# Patient Record
Sex: MALE | Race: WHITE | NOT HISPANIC OR LATINO
[De-identification: names, ages, dates, MRNs, and addresses within clinical notes are randomized per-mention and may not be internally consistent; named-entity substitution may affect disease eponyms.]

---

## 2017-02-27 ENCOUNTER — MOBILE ON CALL (OUTPATIENT)
Age: 47
End: 2017-02-27

## 2017-02-27 DIAGNOSIS — M25.569 PAIN IN UNSPECIFIED KNEE: ICD-10-CM

## 2017-03-06 ENCOUNTER — APPOINTMENT (OUTPATIENT)
Dept: ORTHOPEDIC SURGERY | Facility: CLINIC | Age: 47
End: 2017-03-06

## 2017-03-06 VITALS
HEIGHT: 70 IN | BODY MASS INDEX: 29.35 KG/M2 | DIASTOLIC BLOOD PRESSURE: 69 MMHG | SYSTOLIC BLOOD PRESSURE: 107 MMHG | WEIGHT: 205 LBS | HEART RATE: 87 BPM

## 2017-03-06 DIAGNOSIS — Z78.9 OTHER SPECIFIED HEALTH STATUS: ICD-10-CM

## 2017-03-06 DIAGNOSIS — M23.91 UNSPECIFIED INTERNAL DERANGEMENT OF RIGHT KNEE: ICD-10-CM

## 2017-04-17 ENCOUNTER — APPOINTMENT (OUTPATIENT)
Dept: ORTHOPEDIC SURGERY | Facility: CLINIC | Age: 47
End: 2017-04-17

## 2017-04-17 VITALS
HEIGHT: 70 IN | HEART RATE: 74 BPM | BODY MASS INDEX: 29.35 KG/M2 | WEIGHT: 205 LBS | SYSTOLIC BLOOD PRESSURE: 110 MMHG | DIASTOLIC BLOOD PRESSURE: 74 MMHG

## 2017-05-16 ENCOUNTER — MESSAGE (OUTPATIENT)
Age: 47
End: 2017-05-16

## 2017-05-22 ENCOUNTER — OUTPATIENT (OUTPATIENT)
Dept: OUTPATIENT SERVICES | Facility: HOSPITAL | Age: 47
LOS: 1 days | End: 2017-05-22
Payer: COMMERCIAL

## 2017-05-22 VITALS
HEART RATE: 70 BPM | WEIGHT: 217.38 LBS | TEMPERATURE: 98 F | RESPIRATION RATE: 16 BRPM | DIASTOLIC BLOOD PRESSURE: 78 MMHG | HEIGHT: 70 IN | SYSTOLIC BLOOD PRESSURE: 124 MMHG

## 2017-05-22 DIAGNOSIS — Z41.1 ENCOUNTER FOR COSMETIC SURGERY: Chronic | ICD-10-CM

## 2017-05-22 DIAGNOSIS — S83.241A OTHER TEAR OF MEDIAL MENISCUS, CURRENT INJURY, RIGHT KNEE, INITIAL ENCOUNTER: ICD-10-CM

## 2017-05-22 DIAGNOSIS — Z01.818 ENCOUNTER FOR OTHER PREPROCEDURAL EXAMINATION: ICD-10-CM

## 2017-05-22 DIAGNOSIS — Z98.890 OTHER SPECIFIED POSTPROCEDURAL STATES: Chronic | ICD-10-CM

## 2017-05-22 LAB
ALBUMIN SERPL ELPH-MCNC: 4.5 G/DL — SIGNIFICANT CHANGE UP (ref 3.3–5.2)
ALP SERPL-CCNC: 67 U/L — SIGNIFICANT CHANGE UP (ref 40–120)
ALT FLD-CCNC: 22 U/L — SIGNIFICANT CHANGE UP
ANION GAP SERPL CALC-SCNC: 13 MMOL/L — SIGNIFICANT CHANGE UP (ref 5–17)
APTT BLD: 32.3 SEC — SIGNIFICANT CHANGE UP (ref 27.5–37.4)
AST SERPL-CCNC: 21 U/L — SIGNIFICANT CHANGE UP
BILIRUB SERPL-MCNC: 0.7 MG/DL — SIGNIFICANT CHANGE UP (ref 0.4–2)
BUN SERPL-MCNC: 19 MG/DL — SIGNIFICANT CHANGE UP (ref 8–20)
CALCIUM SERPL-MCNC: 9.2 MG/DL — SIGNIFICANT CHANGE UP (ref 8.6–10.2)
CHLORIDE SERPL-SCNC: 103 MMOL/L — SIGNIFICANT CHANGE UP (ref 98–107)
CO2 SERPL-SCNC: 25 MMOL/L — SIGNIFICANT CHANGE UP (ref 22–29)
CREAT SERPL-MCNC: 0.76 MG/DL — SIGNIFICANT CHANGE UP (ref 0.5–1.3)
GLUCOSE SERPL-MCNC: 112 MG/DL — SIGNIFICANT CHANGE UP (ref 70–115)
HCT VFR BLD CALC: 41.7 % — LOW (ref 42–52)
HGB BLD-MCNC: 15.1 G/DL — SIGNIFICANT CHANGE UP (ref 14–18)
INR BLD: 1.06 RATIO — SIGNIFICANT CHANGE UP (ref 0.88–1.16)
MCHC RBC-ENTMCNC: 31.1 PG — HIGH (ref 27–31)
MCHC RBC-ENTMCNC: 36.2 G/DL — HIGH (ref 32–36)
MCV RBC AUTO: 86 FL — SIGNIFICANT CHANGE UP (ref 80–94)
PLATELET # BLD AUTO: 196 K/UL — SIGNIFICANT CHANGE UP (ref 150–400)
POTASSIUM SERPL-MCNC: 3.6 MMOL/L — SIGNIFICANT CHANGE UP (ref 3.5–5.3)
POTASSIUM SERPL-SCNC: 3.6 MMOL/L — SIGNIFICANT CHANGE UP (ref 3.5–5.3)
PROT SERPL-MCNC: 7.2 G/DL — SIGNIFICANT CHANGE UP (ref 6.6–8.7)
PROTHROM AB SERPL-ACNC: 11.7 SEC — SIGNIFICANT CHANGE UP (ref 9.8–12.7)
RBC # BLD: 4.85 M/UL — SIGNIFICANT CHANGE UP (ref 4.6–6.2)
RBC # FLD: 12.2 % — SIGNIFICANT CHANGE UP (ref 11–15.6)
SODIUM SERPL-SCNC: 141 MMOL/L — SIGNIFICANT CHANGE UP (ref 135–145)
WBC # BLD: 5.8 K/UL — SIGNIFICANT CHANGE UP (ref 4.8–10.8)
WBC # FLD AUTO: 5.8 K/UL — SIGNIFICANT CHANGE UP (ref 4.8–10.8)

## 2017-05-22 PROCEDURE — 85027 COMPLETE CBC AUTOMATED: CPT

## 2017-05-22 PROCEDURE — G0463: CPT

## 2017-05-22 PROCEDURE — 85610 PROTHROMBIN TIME: CPT

## 2017-05-22 PROCEDURE — 80053 COMPREHEN METABOLIC PANEL: CPT

## 2017-05-22 PROCEDURE — 85730 THROMBOPLASTIN TIME PARTIAL: CPT

## 2017-05-22 NOTE — H&P PST ADULT - HISTORY OF PRESENT ILLNESS
48 y/o male c/o right knee pain had MRI which showed right knee meniscus tear patient now presents for right knee arthroscopy partial medial meniscectomy

## 2017-05-22 NOTE — H&P PST ADULT - FAMILY HISTORY
Father  Still living? Yes, Estimated age: 71-80  Family history of BPH, Age at diagnosis: Age Unknown

## 2017-05-22 NOTE — H&P PST ADULT - NEGATIVE SKIN SYMPTOMS
no tumor/no itching/no pitted nails/no hair loss/no change in size/color of mole/no rash/no brittle nails/no dryness

## 2017-05-22 NOTE — H&P PST ADULT - NEGATIVE GASTROINTESTINAL SYMPTOMS
no melena/no constipation/no hematochezia/no abdominal pain/no change in bowel habits/no vomiting/no nausea/no steatorrhea/no jaundice/no diarrhea/no flatulence/no hiccoughs

## 2017-05-22 NOTE — H&P PST ADULT - NEGATIVE BREAST SYMPTOMS
no breast tenderness R/no nipple discharge L/no breast lump L/no nipple discharge R/no breast lump R/no breast tenderness L

## 2017-05-22 NOTE — H&P PST ADULT - GASTROINTESTINAL DETAILS
no distention/no rebound tenderness/no bruit/no organomegaly/no rigidity/no guarding/normal/soft/bowel sounds normal/nontender/no masses palpable

## 2017-05-22 NOTE — H&P PST ADULT - NEGATIVE NEUROLOGICAL SYMPTOMS
no tremors/no loss of consciousness/no weakness/no generalized seizures/no transient paralysis/no headache/no paresthesias/no confusion/no loss of sensation/no hemiparesis/no vertigo/no difficulty walking/no facial palsy/no syncope/no focal seizures

## 2017-05-22 NOTE — H&P PST ADULT - NEGATIVE CARDIOVASCULAR SYMPTOMS
no peripheral edema/no paroxysmal nocturnal dyspnea/no palpitations/no orthopnea/no dyspnea on exertion/no chest pain/no claudication

## 2017-05-22 NOTE — H&P PST ADULT - NEGATIVE MUSCULOSKELETAL SYMPTOMS
no neck pain/no arm pain L/no muscle weakness/no arthralgia/no muscle cramps/no joint swelling/no myalgia

## 2017-05-22 NOTE — H&P PST ADULT - NEGATIVE GENERAL GENITOURINARY SYMPTOMS
no bladder infections/no urinary hesitancy/no dysuria/no renal colic/no incontinence/no flank pain L/normal urinary frequency/no urine discoloration/no flank pain R/no hematuria/normal libido/no nocturia/no gas in urine

## 2017-05-22 NOTE — H&P PST ADULT - NSANTHOSAYNRD_GEN_A_CORE
No. TRINI screening performed.  STOP BANG Legend: 0-2 = LOW Risk; 3-4 = INTERMEDIATE Risk; 5-8 = HIGH Risk

## 2017-05-22 NOTE — H&P PST ADULT - RS GEN PE MLT RESP DETAILS PC
no intercostal retractions/no chest wall tenderness/no subcutaneous emphysema/no rhonchi/airway patent/clear to auscultation bilaterally/no wheezes/good air movement/respirations non-labored/no rales/normal/breath sounds equal

## 2017-05-22 NOTE — H&P PST ADULT - NEGATIVE MALE-SPECIFIC SYMPTOMS
no impotence/no urethral discharge/no scrotal mass L/no undescended testicle R/no erectile dysfunction/no genital sores/no ejaculatory dysfunction/no scrotal mass R/no undescended testicle L/not applicable

## 2017-05-22 NOTE — H&P PST ADULT - NEUROLOGICAL DETAILS
deep reflexes intact/normal strength/sensation intact/superficial reflexes intact/alert and oriented x 3/responds to pain/cranial nerves intact/responds to verbal commands/no spontaneous movement

## 2017-05-22 NOTE — H&P PST ADULT - NEGATIVE ENMT SYMPTOMS
no vertigo/no hearing difficulty/no nasal discharge/no gum bleeding/no tinnitus/no abnormal taste sensation/no ear pain/no throat pain/no post-nasal discharge/no recurrent cold sores/no nose bleeds/no dysphagia/no dry mouth/no nasal obstruction

## 2017-05-22 NOTE — H&P PST ADULT - NEGATIVE PSYCHIATRIC SYMPTOMS
no paranoia/no memory loss/no depression/no anxiety/no mood swings/no suicidal ideation/no visual hallucinations/no insomnia/no agitation/no hyperactivity/no auditory hallucinations

## 2017-05-22 NOTE — H&P PST ADULT - PSH
S/P shoulder surgery  2012 left S/P nasal septoplasty    S/P rhinoplasty    S/P shoulder surgery  2012 left

## 2017-05-22 NOTE — H&P PST ADULT - NEGATIVE GENERAL SYMPTOMS
no polyphagia/no polyuria/no anorexia/no malaise/no sweating/no fatigue/no chills/no weight loss/no polydipsia/no fever/no weight gain

## 2017-05-22 NOTE — H&P PST ADULT - NEGATIVE OPHTHALMOLOGIC SYMPTOMS
no blurred vision R/no pain R/no lacrimation R/no irritation R/no blurred vision L/no loss of vision L/no lacrimation L/no scleral injection R/no irritation L/no discharge L/no loss of vision R/no scleral injection L/no diplopia/no photophobia/no pain L/no discharge R

## 2017-05-22 NOTE — H&P PST ADULT - MUSCULOSKELETAL
details… detailed exam no joint swelling/no joint warmth/normal strength/no calf tenderness/normal/ROM intact/no joint erythema

## 2017-05-22 NOTE — ASU PATIENT PROFILE, ADULT - NS PRO PT RIGHT SUPPORT PERSON
January 22, 2017     Patient: Ashley Nolasco   YOB: 1996   Date of Visit: 1/22/2017       To Whom It May Concern:    It is my medical opinion that Ashley Nolasco may return to work on 1/24/17.  Please excuse 1/22/17 and 1/23/17.  Thank you..           Sincerely,         PROVIDER ANNY BURNETT    CC: Ashley Nolasco  
same name as above

## 2017-05-26 ENCOUNTER — RESULT REVIEW (OUTPATIENT)
Age: 47
End: 2017-05-26

## 2017-05-26 ENCOUNTER — TRANSCRIPTION ENCOUNTER (OUTPATIENT)
Age: 47
End: 2017-05-26

## 2017-05-26 ENCOUNTER — OUTPATIENT (OUTPATIENT)
Dept: OUTPATIENT SERVICES | Facility: HOSPITAL | Age: 47
LOS: 1 days | End: 2017-05-26
Payer: COMMERCIAL

## 2017-05-26 ENCOUNTER — APPOINTMENT (OUTPATIENT)
Dept: ORTHOPEDIC SURGERY | Facility: HOSPITAL | Age: 47
End: 2017-05-26

## 2017-05-26 VITALS
RESPIRATION RATE: 16 BRPM | WEIGHT: 210.1 LBS | DIASTOLIC BLOOD PRESSURE: 68 MMHG | OXYGEN SATURATION: 97 % | TEMPERATURE: 98 F | HEIGHT: 70 IN | HEART RATE: 74 BPM | SYSTOLIC BLOOD PRESSURE: 103 MMHG

## 2017-05-26 VITALS
OXYGEN SATURATION: 100 % | DIASTOLIC BLOOD PRESSURE: 66 MMHG | RESPIRATION RATE: 12 BRPM | SYSTOLIC BLOOD PRESSURE: 104 MMHG | HEART RATE: 62 BPM

## 2017-05-26 DIAGNOSIS — S83.241A OTHER TEAR OF MEDIAL MENISCUS, CURRENT INJURY, RIGHT KNEE, INITIAL ENCOUNTER: ICD-10-CM

## 2017-05-26 DIAGNOSIS — Z98.890 OTHER SPECIFIED POSTPROCEDURAL STATES: Chronic | ICD-10-CM

## 2017-05-26 DIAGNOSIS — Z41.1 ENCOUNTER FOR COSMETIC SURGERY: Chronic | ICD-10-CM

## 2017-05-26 PROCEDURE — 88304 TISSUE EXAM BY PATHOLOGIST: CPT | Mod: 26

## 2017-05-26 PROCEDURE — 29881 ARTHRS KNE SRG MNISECTMY M/L: CPT | Mod: AS,RT

## 2017-05-26 PROCEDURE — 88304 TISSUE EXAM BY PATHOLOGIST: CPT

## 2017-05-26 PROCEDURE — 29881 ARTHRS KNE SRG MNISECTMY M/L: CPT | Mod: RT

## 2017-05-26 RX ORDER — FENTANYL CITRATE 50 UG/ML
25 INJECTION INTRAVENOUS
Qty: 0 | Refills: 0 | Status: DISCONTINUED | OUTPATIENT
Start: 2017-05-26 | End: 2017-05-26

## 2017-05-26 RX ORDER — SODIUM CHLORIDE 9 MG/ML
1000 INJECTION, SOLUTION INTRAVENOUS
Qty: 0 | Refills: 0 | Status: DISCONTINUED | OUTPATIENT
Start: 2017-05-26 | End: 2017-05-26

## 2017-05-26 RX ORDER — ONDANSETRON 8 MG/1
4 TABLET, FILM COATED ORAL ONCE
Qty: 0 | Refills: 0 | Status: DISCONTINUED | OUTPATIENT
Start: 2017-05-26 | End: 2017-05-26

## 2017-05-26 NOTE — ASU DISCHARGE PLAN (ADULT/PEDIATRIC). - MEDICATION SUMMARY - MEDICATIONS TO TAKE
I will START or STAY ON the medications listed below when I get home from the hospital:    Percocet 5/325 oral tablet  -- 1 tab(s) by mouth every 6 hours, As Needed MDD:4  -- Caution federal law prohibits the transfer of this drug to any person other  than the person for whom it was prescribed.  May cause drowsiness.  Alcohol may intensify this effect.  Use care when operating dangerous machinery.  This prescription cannot be refilled.  This product contains acetaminophen.  Do not use  with any other product containing acetaminophen to prevent possible liver damage.  Using more of this medication than prescribed may cause serious breathing problems.    -- Indication: For pain medication    guaiFENesin 100 mg oral tablet  -- 1  by mouth 2 times a day, As Needed  -- Indication: For home med    Pseudoephedrine Sinus  -- 2  in each nostril once a day, As Needed  -- Indication: For home med

## 2017-05-26 NOTE — BRIEF OPERATIVE NOTE - PROCEDURE
Arthroscopy of knee with debridement of meniscus  05/26/2017  Right knee arthroscopic partial medial meniscectomy, MFC chondroplasty  Active  JOSE DAVID

## 2017-05-26 NOTE — PHYSICAL THERAPY INITIAL EVALUATION ADULT - GENERAL OBSERVATIONS, REHAB EVAL
Pt found lying in bed with c/o 5/10 right knee pain before and after session, pleasant and engaging and agreeable to PT

## 2017-05-26 NOTE — PHYSICAL THERAPY INITIAL EVALUATION ADULT - ADDITIONAL COMMENTS
Pt lives with family dante 2 story house with 2 steps to enter.  Independent with all PTA, without devices.

## 2017-05-26 NOTE — ASU DISCHARGE PLAN (ADULT/PEDIATRIC). - NOTIFY
Numbness, color, or temperature change to extremity/Swelling that continues/Pain not relieved by Medications/Bleeding that does not stop

## 2017-05-31 LAB — SURGICAL PATHOLOGY FINAL REPORT - CH: SIGNIFICANT CHANGE UP

## 2017-06-09 ENCOUNTER — OTHER (OUTPATIENT)
Age: 47
End: 2017-06-09

## 2017-06-12 ENCOUNTER — OTHER (OUTPATIENT)
Age: 47
End: 2017-06-12

## 2017-06-12 ENCOUNTER — APPOINTMENT (OUTPATIENT)
Dept: ORTHOPEDIC SURGERY | Facility: CLINIC | Age: 47
End: 2017-06-12

## 2017-06-12 DIAGNOSIS — Z47.1 AFTERCARE FOLLOWING JOINT REPLACEMENT SURGERY: ICD-10-CM

## 2017-06-12 DIAGNOSIS — Z96.651 AFTERCARE FOLLOWING JOINT REPLACEMENT SURGERY: ICD-10-CM

## 2017-07-26 ENCOUNTER — APPOINTMENT (OUTPATIENT)
Dept: ORTHOPEDIC SURGERY | Facility: CLINIC | Age: 47
End: 2017-07-26

## 2017-08-15 ENCOUNTER — RESULT REVIEW (OUTPATIENT)
Age: 47
End: 2017-08-15

## 2018-01-23 ENCOUNTER — TRANSCRIPTION ENCOUNTER (OUTPATIENT)
Age: 48
End: 2018-01-23

## 2018-06-04 ENCOUNTER — APPOINTMENT (OUTPATIENT)
Dept: ORTHOPEDIC SURGERY | Facility: CLINIC | Age: 48
End: 2018-06-04

## 2019-09-28 PROBLEM — J34.9 UNSPECIFIED DISORDER OF NOSE AND NASAL SINUSES: Chronic | Status: ACTIVE | Noted: 2017-05-22

## 2019-09-28 PROBLEM — J32.9 CHRONIC SINUSITIS, UNSPECIFIED: Chronic | Status: ACTIVE | Noted: 2017-05-22

## 2019-10-29 ENCOUNTER — APPOINTMENT (OUTPATIENT)
Dept: ORTHOPEDIC SURGERY | Facility: CLINIC | Age: 49
End: 2019-10-29

## 2020-12-11 NOTE — PHYSICAL THERAPY INITIAL EVALUATION ADULT - ASR WT BEARING STATUS EVAL
no weight-bearing restrictions Initiate Treatment: \\n- Clobetasol Ointment BID x 2-3 weeks Detail Level: Zone

## 2021-10-08 NOTE — ASU PATIENT PROFILE, ADULT - URINARY CATHETER
Group Topic: BH Coping Skills Education    Date: 10/8/2021  Start Time: 1300  End Time: 1345  Facilitators: Anju Ríos MS    Focus: Art therapy  Number in attendance: 5      Method: Group  Attendance: Present  Participation: Active  Patient Response: Attentive  Mood: Normal  Affect: Type: Euthymic (normal mood)   Range: Full (normal)   Congruency: Congruent   Stability: Stable  Behavior/Socialization: Appropriate to group  Thought Process: Tracking  Task Performance: Follows directions  Patient Evaluation: Independent - full participation   Pt quietly engaged in art activity the duration of group.  Anju Ríos, MS     no

## 2022-08-22 ENCOUNTER — APPOINTMENT (OUTPATIENT)
Dept: OTOLARYNGOLOGY | Facility: CLINIC | Age: 52
End: 2022-08-22

## 2022-08-22 VITALS
DIASTOLIC BLOOD PRESSURE: 78 MMHG | SYSTOLIC BLOOD PRESSURE: 113 MMHG | BODY MASS INDEX: 30.78 KG/M2 | HEIGHT: 70 IN | WEIGHT: 215 LBS | HEART RATE: 76 BPM

## 2022-08-22 DIAGNOSIS — K21.9 GASTRO-ESOPHAGEAL REFLUX DISEASE W/OUT ESOPHAGITIS: ICD-10-CM

## 2022-08-22 PROCEDURE — 99203 OFFICE O/P NEW LOW 30 MIN: CPT | Mod: 25

## 2022-08-22 PROCEDURE — 31231 NASAL ENDOSCOPY DX: CPT

## 2022-08-22 NOTE — ASSESSMENT
[FreeTextEntry1] : Reviewed and reconciled medications, allergies, PMHx, PSHx, SocHx, FMHx \par \par December 23, 2019 he had closure of septal perforation with biodesign porcelain graft and silicone sheets. - experiencing nasal congestion, discharge, and phlegm. 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 FU once a year.

## 2022-08-22 NOTE — CONSULT LETTER
[Dear  ___] : Dear  [unfilled], [Courtesy Letter:] : I had the pleasure of seeing your patient, [unfilled], in my office today. [Please see my note below.] : Please see my note below. [Consult Closing:] : Thank you very much for allowing me to participate in the care of this patient.  If you have any questions, please do not hesitate to contact me. [Sincerely,] : Sincerely, [FreeTextEntry1] : Librado Pearson MD FACS

## 2022-08-22 NOTE — PHYSICAL EXAM
[Hearing Meza Test (Tuning Fork On Forehead)] : no lateralization of tone [Midline] : trachea located in midline position [Normal] : orientation to person, place, and time: normal [FreeTextEntry1] : graft in place.\par sutures still there [de-identified] : leukoplakia on the cheeks, erosive on the right side. [de-identified] : class 1 [de-identified] : IGOR

## 2022-08-22 NOTE — HISTORY OF PRESENT ILLNESS
[de-identified] : Patient presents sinus infection for 4 weeks went to primary care and has been using mupirocin. Has been dissolving it in sinus rinse. States primary care told him to make an appointment to been by ENT. He Has nassal congestion, green discharge, using over the counter sudafed.  Has post nasal drip and phelgm.  December 23, 2019 he had closure of septal perforation with biodesign porcelain graft and silicone sheets.

## 2022-08-22 NOTE — PROCEDURE
[Recalcitrant Symptoms] : recalcitrant symptoms  [FreeTextEntry6] : left side:\par -graft in place, minimal amount of debris \par -sinuses open and clear\par -middle meatus nice and clean\par \par right side:\par -middle meatus clean\par

## 2023-08-22 ENCOUNTER — APPOINTMENT (OUTPATIENT)
Dept: OTOLARYNGOLOGY | Facility: CLINIC | Age: 53
End: 2023-08-22
Payer: COMMERCIAL

## 2023-08-22 VITALS
HEART RATE: 68 BPM | HEIGHT: 70 IN | WEIGHT: 215 LBS | DIASTOLIC BLOOD PRESSURE: 73 MMHG | BODY MASS INDEX: 30.78 KG/M2 | SYSTOLIC BLOOD PRESSURE: 111 MMHG

## 2023-08-22 DIAGNOSIS — K13.21 LEUKOPLAKIA OF ORAL MUCOSA, INCLUDING TONGUE: ICD-10-CM

## 2023-08-22 PROCEDURE — 99213 OFFICE O/P EST LOW 20 MIN: CPT | Mod: 25

## 2023-08-22 PROCEDURE — 31231 NASAL ENDOSCOPY DX: CPT

## 2023-08-22 NOTE — CONSULT LETTER
[Dear  ___] : Dear  [unfilled], [Courtesy Letter:] : I had the pleasure of seeing your patient, [unfilled], in my office today. [Please see my note below.] : Please see my note below. [Consult Closing:] : Thank you very much for allowing me to participate in the care of this patient.  If you have any questions, please do not hesitate to contact me. [Sincerely,] : Sincerely, [FreeTextEntry3] : Librado Pearson MD FACS

## 2023-08-22 NOTE — PROCEDURE
[None] : none [FreeTextEntry6] : Flexible Nasal Endoscopy left graft in place middle meatus in place sinus ostium open right middle meatus clear  graft in place [de-identified] : septal perf

## 2023-08-22 NOTE — HISTORY OF PRESENT ILLNESS
[de-identified] : Mr. Cutler is a 53 year patient who presents today for annual check-up. No recent infections. Sinuses are doing well.

## 2023-08-22 NOTE — PHYSICAL EXAM
[Hearing Meza Test (Tuning Fork On Forehead)] : no lateralization of tone [Normal] : orientation to person, place, and time: normal [FreeTextEntry1] : sensations intact sinuses nontender to percussion  [Hearing Loss Right Only] : normal [Hearing Loss Left Only] : normal [de-identified] : graft over septal perforation [de-identified] : Tonsils Type 2 elongated uvula, leukoplakia cheeks

## 2023-08-22 NOTE — ASSESSMENT
[FreeTextEntry1] : Reviewed and Reconciled the pmhx, fmhx, sochx, and medhx Mr. Cutler is a 53 year patient who presents today for annual check-up. No recent infections. Sinuses are doing well.  Physical Exam: graft over septal perforation Tonsils Type 2 elongated uvula, leukoplakia cheeks  Flexible Nasal Endoscopy left graft in place middle meatus in place sinus ostium open right middle meatus clear  graft in place  Plan: Mucopuricin ointment refilled. FU 1 year

## 2023-08-22 NOTE — ADDENDUM
[FreeTextEntry1] : Documented by Edel Kemp acting as a scribe for Dr. Pearson on [mm//dd/yyyy].   All Medical record entries made by the scribe were at my, Dr. Pearson, direction and personally directed by me on 08/22/2023. I have reviewed the chart and agree that the record accurately reflects my personal performance of the history, physical exam, assessment, and plan. I have also personally directed, reviewed, and agreed with the discharge instructions.

## 2023-12-29 ENCOUNTER — APPOINTMENT (OUTPATIENT)
Dept: OTOLARYNGOLOGY | Facility: CLINIC | Age: 53
End: 2023-12-29
Payer: COMMERCIAL

## 2023-12-29 VITALS
SYSTOLIC BLOOD PRESSURE: 110 MMHG | BODY MASS INDEX: 30.78 KG/M2 | HEART RATE: 93 BPM | WEIGHT: 215 LBS | HEIGHT: 70 IN | DIASTOLIC BLOOD PRESSURE: 70 MMHG

## 2023-12-29 PROCEDURE — 99213 OFFICE O/P EST LOW 20 MIN: CPT

## 2023-12-29 RX ORDER — MUPIROCIN 20 MG/G
2 OINTMENT TOPICAL
Qty: 1 | Refills: 5 | Status: ACTIVE | COMMUNITY
Start: 2022-08-22 | End: 1900-01-01

## 2024-01-02 NOTE — PHYSICAL EXAM
[Midline] : trachea located in midline position [Normal] : orientation to person, place, and time: normal [de-identified] : +siva. nasal valve collapse. one suture may have come loose in right nasal graft and the anterior graft is changing color. Heavy thick white cotaing on the silastic of the left graft [de-identified] : minimal uvua edema [de-identified] : class 2 [FreeTextEntry2] :  sinuses nontender to percussion.  sensations intact

## 2024-01-02 NOTE — PROCEDURE
[FreeTextEntry1] : Culture [FreeTextEntry3] : Used a swab on the nasal graft for Aerobic. Anaerobic, and Fungal culture [FreeTextEntry2] : discolored nasal grafts

## 2024-01-02 NOTE — ASSESSMENT
[FreeTextEntry1] :  Reviewed and reconciled medications, allergies, PMHx, PSHx, SocHx, FMHx.  Pt has a h/o of grafts in his nose. Pt presents today stating that has trouble breathing through his left nostril. Pt states that he was sick on Friday with a sinus infection. Pt states that sometimes he gets a bad taste in his mouth.   Physical exam: +siva -nasal valve collapse -one suture may have come loose in right nasal graft and the anterior graft is changing color -Heavy thick white coating on the silastic of the left graft  ENT Procedure: Culture -Aerobic. Anaerobic, and Fungal   Plan: -continue Mupirocin ointment -continue sinus rinse -discussed r/b/a of trimming nasal grafts -follow up with results from culture

## 2024-01-03 LAB — EAR NOSE AND THROAT CULTURE: ABNORMAL

## 2024-02-14 ENCOUNTER — APPOINTMENT (OUTPATIENT)
Dept: OTOLARYNGOLOGY | Facility: CLINIC | Age: 54
End: 2024-02-14
Payer: COMMERCIAL

## 2024-02-14 VITALS
DIASTOLIC BLOOD PRESSURE: 76 MMHG | HEART RATE: 83 BPM | HEIGHT: 70 IN | BODY MASS INDEX: 30.78 KG/M2 | SYSTOLIC BLOOD PRESSURE: 114 MMHG | WEIGHT: 215 LBS

## 2024-02-14 DIAGNOSIS — J32.9 CHRONIC SINUSITIS, UNSPECIFIED: ICD-10-CM

## 2024-02-14 PROCEDURE — 99214 OFFICE O/P EST MOD 30 MIN: CPT

## 2024-02-14 NOTE — ADDENDUM
Pt called stating that she misplaced the instructions for her procedure tomorrow. She was aware that she was only supposed to have liquids today, and hasn't had any solid food. Reviewed the meds pt needs to purchase and when she is supposed to start taking them. Pt verbalized understanding, arrival time reviewed. [FreeTextEntry1] :  Documented by Meir Correa acting as scribe for Dr. Pearson on 02/14/2024. All Medical record entries made by the Scribe were at my, Dr. Pearson, direction and personally dictated by me on 02/14/2024 . I have reviewed the chart and agree that the record accurately reflects my personal performance of the history, physical exam, assessment and plan. I have also personally directed, reviewed, and agreed with the discharge instructions.

## 2024-02-14 NOTE — PHYSICAL EXAM
[Normal] : orientation to person, place, and time: normal [de-identified] : silicone splints in septum of nose bilaterally covering septal perforation collecting debris anteriorly on left side and posteriorly on right side - debris removed via suction.  [FreeTextEntry2] :  sinuses nontender to percussion.  sensations intact

## 2024-02-14 NOTE — ASSESSMENT
[FreeTextEntry1] :  Reviewed and reconciled medications, allergies, PMHx, PSHx, SocHx, FMHx.  Pt has a h/o of grafts in his nose and difficulty breathing through left nostril presents today stating that he still has nasal congestion. Pt states that last week he was given a Z-pack which relieved his nasal congestion. Pt states that he has yellow mucus coming from his nose. Pt states that the congestion in his nose does not clear with sinus rinse.   Physical exam: -silicone splints in septum of nose bilaterally covering septal perforation collecting debris anteriorly on left side and posteriorly on right side - debris removed via suction.   Plan: -discussed r/b/a of nasal splint replacement in OR (opted for anesthesia) -Continue sinus rinse with added Mupirocin -Use Afrin for 3 days -Use steam -ordered CT scan of sinuses for potential surgical planning (do not use sinus rinse or nasal sprays for 3 days before CT scan) -FU after procedure and with results from CT scan

## 2024-02-14 NOTE — HISTORY OF PRESENT ILLNESS
[de-identified] : Pt has a h/o of grafts in his nose and difficulty breathing through left nostril presents today stating that he still has nasal congestion. Pt states that last week he was given a Z-pack which relieved his nasal congestion. Pt states that he has yellow mucus coming from his nose. Pt states that the congestion in his nose does not clear with sinus rinse.

## 2024-02-26 ENCOUNTER — APPOINTMENT (OUTPATIENT)
Dept: CT IMAGING | Facility: CLINIC | Age: 54
End: 2024-02-26
Payer: COMMERCIAL

## 2024-02-26 PROCEDURE — 70486 CT MAXILLOFACIAL W/O DYE: CPT

## 2024-03-25 ENCOUNTER — LABORATORY RESULT (OUTPATIENT)
Age: 54
End: 2024-03-25

## 2024-04-04 ENCOUNTER — APPOINTMENT (OUTPATIENT)
Dept: OTOLARYNGOLOGY | Facility: AMBULATORY MEDICAL SERVICES | Age: 54
End: 2024-04-04
Payer: COMMERCIAL

## 2024-04-04 PROCEDURE — 30630 REPAIR NASAL SEPTUM DEFECT: CPT

## 2024-04-08 ENCOUNTER — APPOINTMENT (OUTPATIENT)
Dept: OTOLARYNGOLOGY | Facility: CLINIC | Age: 54
End: 2024-04-08

## 2024-05-05 ENCOUNTER — NON-APPOINTMENT (OUTPATIENT)
Age: 54
End: 2024-05-05

## 2024-05-06 ENCOUNTER — APPOINTMENT (OUTPATIENT)
Dept: OTOLARYNGOLOGY | Facility: CLINIC | Age: 54
End: 2024-05-06
Payer: COMMERCIAL

## 2024-05-06 VITALS
SYSTOLIC BLOOD PRESSURE: 131 MMHG | HEART RATE: 84 BPM | HEIGHT: 60 IN | DIASTOLIC BLOOD PRESSURE: 82 MMHG | BODY MASS INDEX: 43.19 KG/M2 | WEIGHT: 220 LBS

## 2024-05-06 DIAGNOSIS — Z98.890 OTHER SPECIFIED POSTPROCEDURAL STATES: ICD-10-CM

## 2024-05-06 PROCEDURE — 99024 POSTOP FOLLOW-UP VISIT: CPT

## 2024-05-06 NOTE — HISTORY OF PRESENT ILLNESS
[de-identified] : Patient presents s/p removal of bilateral septal splints, exploration of the nose, and then a closure of septal perforation with a bivalved septal button. Patient states feel like something is blocking his breathing.

## 2024-05-06 NOTE — PHYSICAL EXAM
[FreeTextEntry1] : nasally:  right side: slight scabbing, dry mucus left side: huge scab and dry mucus on the knot removed without difficultly.   States after removal feels like can breath better.

## 2024-05-06 NOTE — CONSULT LETTER
[Dear  ___] : Dear  [unfilled], [Courtesy Letter:] : I had the pleasure of seeing your patient, [unfilled], in my office today. [Please see my note below.] : Please see my note below. [Referral Closing:] : Thank you very much for seeing this patient.  If you have any questions, please do not hesitate to contact me. [Sincerely,] : Sincerely, [FreeTextEntry3] : Javon Phillips PA-C

## 2024-05-06 NOTE — ASSESSMENT
[FreeTextEntry1] : Reviewed and reconciled medications, allergies, PMHx, PSHx, SocHx, FMHx.      physical exam: nasally:  right side: slight scabbing, dry mucus left side: huge scab and dry mucus on the knot removed without difficultly.   States after removal feels like can breathe better.      Plan: Follow up in next week      Case discussed with Dr. Pearson

## 2024-05-14 ENCOUNTER — APPOINTMENT (OUTPATIENT)
Dept: OTOLARYNGOLOGY | Facility: CLINIC | Age: 54
End: 2024-05-14
Payer: COMMERCIAL

## 2024-05-14 VITALS
WEIGHT: 215 LBS | HEART RATE: 80 BPM | HEIGHT: 70 IN | BODY MASS INDEX: 30.78 KG/M2 | DIASTOLIC BLOOD PRESSURE: 77 MMHG | SYSTOLIC BLOOD PRESSURE: 119 MMHG

## 2024-05-14 DIAGNOSIS — J34.89 OTHER SPECIFIED DISORDERS OF NOSE AND NASAL SINUSES: ICD-10-CM

## 2024-05-14 DIAGNOSIS — T17.1XXS FOREIGN BODY IN NOSTRIL, SEQUELA: ICD-10-CM

## 2024-05-14 DIAGNOSIS — J31.0 CHRONIC RHINITIS: ICD-10-CM

## 2024-05-14 DIAGNOSIS — R09.82 POSTNASAL DRIP: ICD-10-CM

## 2024-05-14 DIAGNOSIS — G47.33 OBSTRUCTIVE SLEEP APNEA (ADULT) (PEDIATRIC): ICD-10-CM

## 2024-05-14 DIAGNOSIS — M95.0 ACQUIRED DEFORMITY OF NOSE: ICD-10-CM

## 2024-05-14 PROCEDURE — 99214 OFFICE O/P EST MOD 30 MIN: CPT | Mod: 24

## 2024-05-14 RX ORDER — IPRATROPIUM BROMIDE 42 UG/1
0.06 SPRAY NASAL AT BEDTIME
Qty: 1 | Refills: 0 | Status: ACTIVE | COMMUNITY
Start: 2024-05-14 | End: 1900-01-01

## 2024-05-14 NOTE — PHYSICAL EXAM
[Normal] : mucosa is normal [Midline] : trachea located in midline position [de-identified] : positive siva maneuver. nasal valve collapse [de-identified] : class 1 [de-identified] : uvula sits on the BOT. type 3 oral cavity

## 2024-05-14 NOTE — ASSESSMENT
[FreeTextEntry1] :  Reviewed and reconciled medications, allergies, PMHx, PSHx, SocHx, FMHx.  Pt has a h/o of grafts in his nose and difficulty breathing through left nostril presents today s/p removal of bilateral septal splints, exploration of the nose, and then a closure of septal perforation with a bivalved septal button 4/4/24. He states that he is doing well with improved nasal breathing. He states that he has been using saline spray. He states that he feels like he is choking in his sleep even with an oral appliance, especially when laying on his back. He states that he has a PND.   Physical exam: -positive siva maneuver -nasal valve collapse -uvula sits on the BOT -type 3 oral cavity -class 1 tonsils   Plan: -Adjust oral appliance with denists -Start Atrovent - 1 or 2 sprays bilaterally up to QID, spray laterally -We discussed bilateral Vivaer procedure for nasal valve repair and inferior turbinate reduction. R/b/a discussed. Risks include but are not limited to bleeding, infection, crusting, scarring, injury to nasal mucosa, persistence of symptoms, need for future procedure. All questions were answered. -FU after procedure

## 2024-05-14 NOTE — HISTORY OF PRESENT ILLNESS
[de-identified] : Pt has a h/o of grafts in his nose and difficulty breathing through left nostril presents today s/p removal of bilateral septal splints, exploration of the nose, and then a closure of septal perforation with a bivalved septal button 4/4/24. He states that he is doing well with improved nasal breathing. He states that he has been using saline spray. He states that he feels like he is choking in his sleep even with an oral appliance, especially when laying on his back. He states that he has a PND.

## 2024-08-20 ENCOUNTER — APPOINTMENT (OUTPATIENT)
Dept: OTOLARYNGOLOGY | Facility: CLINIC | Age: 54
End: 2024-08-20

## 2024-10-15 ENCOUNTER — RX RENEWAL (OUTPATIENT)
Age: 54
End: 2024-10-15

## 2025-06-11 NOTE — H&P PST ADULT - NS PRO PASSIVE SMOKE EXP
No
handoff and dc summary provided to [facility] ACT team. inpatient team can be contacted at [phone]